# Patient Record
Sex: FEMALE | Race: BLACK OR AFRICAN AMERICAN | Employment: UNEMPLOYED | URBAN - METROPOLITAN AREA
[De-identification: names, ages, dates, MRNs, and addresses within clinical notes are randomized per-mention and may not be internally consistent; named-entity substitution may affect disease eponyms.]

---

## 2018-07-02 ENCOUNTER — APPOINTMENT (OUTPATIENT)
Dept: GENERAL RADIOLOGY | Age: 68
End: 2018-07-02
Attending: EMERGENCY MEDICINE
Payer: MEDICARE

## 2018-07-02 ENCOUNTER — HOSPITAL ENCOUNTER (EMERGENCY)
Age: 68
Discharge: HOME | End: 2018-07-02
Attending: EMERGENCY MEDICINE
Payer: MEDICARE

## 2018-07-02 VITALS
RESPIRATION RATE: 18 BRPM | WEIGHT: 135 LBS | SYSTOLIC BLOOD PRESSURE: 161 MMHG | HEART RATE: 63 BPM | BODY MASS INDEX: 26.5 KG/M2 | HEIGHT: 60 IN | DIASTOLIC BLOOD PRESSURE: 87 MMHG | OXYGEN SATURATION: 99 % | TEMPERATURE: 96.9 F

## 2018-07-02 DIAGNOSIS — T14.8XXA MUSCLE STRAIN: ICD-10-CM

## 2018-07-02 DIAGNOSIS — M25.541 ARTHRALGIA OF RIGHT HAND: Primary | ICD-10-CM

## 2018-07-02 LAB
ALBUMIN SERPL-MCNC: 3.7 G/DL (ref 3.4–5)
ALBUMIN/GLOB SERPL: 0.8 {RATIO} (ref 0.8–1.7)
ALP SERPL-CCNC: 81 U/L (ref 45–117)
ALT SERPL-CCNC: 11 U/L (ref 13–56)
ANION GAP SERPL CALC-SCNC: 3 MMOL/L (ref 3–18)
AST SERPL-CCNC: 17 U/L (ref 15–37)
ATRIAL RATE: 64 BPM
BASOPHILS # BLD: 0 K/UL (ref 0–0.06)
BASOPHILS NFR BLD: 1 % (ref 0–2)
BILIRUB SERPL-MCNC: 0.4 MG/DL (ref 0.2–1)
BUN SERPL-MCNC: 12 MG/DL (ref 7–18)
BUN/CREAT SERPL: 12 (ref 12–20)
CALCIUM SERPL-MCNC: 9.3 MG/DL (ref 8.5–10.1)
CALCULATED P AXIS, ECG09: 20 DEGREES
CALCULATED R AXIS, ECG10: -8 DEGREES
CALCULATED T AXIS, ECG11: 32 DEGREES
CHLORIDE SERPL-SCNC: 109 MMOL/L (ref 100–108)
CO2 SERPL-SCNC: 30 MMOL/L (ref 21–32)
CREAT SERPL-MCNC: 0.98 MG/DL (ref 0.6–1.3)
DIAGNOSIS, 93000: NORMAL
DIFFERENTIAL METHOD BLD: ABNORMAL
EOSINOPHIL # BLD: 0.1 K/UL (ref 0–0.4)
EOSINOPHIL NFR BLD: 1 % (ref 0–5)
ERYTHROCYTE [DISTWIDTH] IN BLOOD BY AUTOMATED COUNT: 12.6 % (ref 11.6–14.5)
GLOBULIN SER CALC-MCNC: 4.6 G/DL (ref 2–4)
GLUCOSE SERPL-MCNC: 90 MG/DL (ref 74–99)
HCT VFR BLD AUTO: 38.9 % (ref 35–45)
HGB BLD-MCNC: 13.4 G/DL (ref 12–16)
LYMPHOCYTES # BLD: 3 K/UL (ref 0.9–3.6)
LYMPHOCYTES NFR BLD: 47 % (ref 21–52)
MCH RBC QN AUTO: 32.4 PG (ref 24–34)
MCHC RBC AUTO-ENTMCNC: 34.4 G/DL (ref 31–37)
MCV RBC AUTO: 94.2 FL (ref 74–97)
MONOCYTES # BLD: 0.5 K/UL (ref 0.05–1.2)
MONOCYTES NFR BLD: 8 % (ref 3–10)
NEUTS SEG # BLD: 2.8 K/UL (ref 1.8–8)
NEUTS SEG NFR BLD: 43 % (ref 40–73)
P-R INTERVAL, ECG05: 148 MS
PLATELET # BLD AUTO: 216 K/UL (ref 135–420)
PMV BLD AUTO: 9.8 FL (ref 9.2–11.8)
POTASSIUM SERPL-SCNC: 3.8 MMOL/L (ref 3.5–5.5)
PROT SERPL-MCNC: 8.3 G/DL (ref 6.4–8.2)
Q-T INTERVAL, ECG07: 384 MS
QRS DURATION, ECG06: 72 MS
QTC CALCULATION (BEZET), ECG08: 396 MS
RBC # BLD AUTO: 4.13 M/UL (ref 4.2–5.3)
SODIUM SERPL-SCNC: 142 MMOL/L (ref 136–145)
VENTRICULAR RATE, ECG03: 64 BPM
WBC # BLD AUTO: 6.4 K/UL (ref 4.6–13.2)

## 2018-07-02 PROCEDURE — 85025 COMPLETE CBC W/AUTO DIFF WBC: CPT | Performed by: EMERGENCY MEDICINE

## 2018-07-02 PROCEDURE — 74011250637 HC RX REV CODE- 250/637: Performed by: EMERGENCY MEDICINE

## 2018-07-02 PROCEDURE — 80053 COMPREHEN METABOLIC PANEL: CPT | Performed by: EMERGENCY MEDICINE

## 2018-07-02 PROCEDURE — 99284 EMERGENCY DEPT VISIT MOD MDM: CPT

## 2018-07-02 PROCEDURE — 93005 ELECTROCARDIOGRAM TRACING: CPT

## 2018-07-02 PROCEDURE — 71045 X-RAY EXAM CHEST 1 VIEW: CPT

## 2018-07-02 RX ORDER — NAPROXEN 500 MG/1
500 TABLET ORAL 2 TIMES DAILY WITH MEALS
Qty: 20 TAB | Refills: 0 | Status: SHIPPED | OUTPATIENT
Start: 2018-07-02 | End: 2018-07-12

## 2018-07-02 RX ORDER — PREDNISONE 50 MG/1
50 TABLET ORAL DAILY
Qty: 3 TAB | Refills: 0 | Status: SHIPPED | OUTPATIENT
Start: 2018-07-02 | End: 2018-07-02

## 2018-07-02 RX ORDER — ALBUTEROL SULFATE 90 UG/1
1 AEROSOL, METERED RESPIRATORY (INHALATION)
Qty: 1 INHALER | Refills: 0 | Status: SHIPPED | OUTPATIENT
Start: 2018-07-02 | End: 2018-07-02

## 2018-07-02 RX ORDER — NAPROXEN 250 MG/1
500 TABLET ORAL 2 TIMES DAILY WITH MEALS
Status: DISCONTINUED | OUTPATIENT
Start: 2018-07-02 | End: 2018-07-02 | Stop reason: HOSPADM

## 2018-07-02 RX ADMIN — Medication 500 MG: at 10:15

## 2018-07-02 NOTE — ED NOTES
Discharge Instructions reviewed with patient/family. Patient/family  states understanding. . Condition stable/imroved. Opportunity for questions provided. E-sign not available  Hard copy instructions signed. Pt states verbal understanding of instructions  Arm band removed and shredded.

## 2018-07-02 NOTE — ED PROVIDER NOTES
EMERGENCY DEPARTMENT HISTORY AND PHYSICAL EXAM    9:55 AM      Date: 7/2/2018  Patient Name: Christiano Anguiano    History of Presenting Illness     Chief Complaint   Patient presents with    Shortness of Breath    Hand Pain     History Provided By: Patient    Additional History (Context): Christiano Anguiano is a 75 y.o. female with No significant past medical history who presents with constant 8/10 back pain that started 3 days ago. Pt reports her right thoracic area back pain is exacerbated by raising her right arm above her shoulder. The pain was described as a sharp sensation. She denies traumatic injury. Her pain is causing her to intermittently become SOB. No other concerns, modifying factors, or symptoms were expressed by the pt at this time. PCP: Prudence Kay MD    Chief Complaint: Back pain  Duration:  3 days  Timing:  constant  Location: Right thoracic  Quality: Sharp  Severity: 8/10  Modifying Factors: Raising right arm exacerbates the pain  Associated Symptoms: SOB      Current Facility-Administered Medications   Medication Dose Route Frequency Provider Last Rate Last Dose    naproxen (NAPROSYN) tablet 500 mg  500 mg Oral BID WITH MEALS Madaline Burkitt, MD         Current Outpatient Prescriptions   Medication Sig Dispense Refill    meloxicam (MOBIC) 15 mg tablet Take 1 Tab by mouth daily. 30 Tab 3    cyclobenzaprine (FLEXERIL) 10 mg tablet Take 1 Tab by mouth three (3) times daily (with meals). 90 Tab 3    oxyCODONE-acetaminophen (PERCOCET) 5-325 mg per tablet Take 1 Tab by mouth every eight (8) hours as needed for Pain. Max Daily Amount: 3 Tabs. 61 Tab 0       Past History     Past Medical History:  No past medical history on file. Past Surgical History:  No past surgical history on file.     Family History:  Family History   Problem Relation Age of Onset    Cancer Mother        Social History:  Social History   Substance Use Topics    Smoking status: Never Smoker    Smokeless tobacco: Not on file    Alcohol use No       Allergies:  No Known Allergies      Review of Systems     Review of Systems   Constitutional: Negative for chills and fatigue. Respiratory: Positive for shortness of breath. Cardiovascular: Negative for chest pain. Gastrointestinal: Negative for abdominal pain, nausea and vomiting. Genitourinary: Negative for hematuria. Musculoskeletal: Positive for back pain (right thoracic). Negative for neck pain. Neurological: Negative for weakness and headaches. All other systems reviewed and are negative. Physical Exam     Visit Vitals    BP (!) 189/129 (BP 1 Location: Left arm, BP Patient Position: At rest;Sitting)    Pulse 70    Temp 96.9 °F (36.1 °C)    Resp 16    Ht 5' (1.524 m)    Wt 61.2 kg (135 lb)    SpO2 99%    BMI 26.37 kg/m2       Physical Exam   Constitutional: She is oriented to person, place, and time. She appears well-developed. HENT:   Head: Normocephalic and atraumatic. Eyes: EOM are normal. Pupils are equal, round, and reactive to light. Neck: Normal range of motion. Neck supple. Cardiovascular: Normal rate, regular rhythm and normal heart sounds. Exam reveals no friction rub. No murmur heard. Pulmonary/Chest: Effort normal and breath sounds normal. No respiratory distress. She has no wheezes. Abdominal: Soft. She exhibits no distension. There is no tenderness. There is no rebound and no guarding. Musculoskeletal: Normal range of motion. Pain to rhomboid, lower, on right; worse with arm movement   Neurological: She is alert and oriented to person, place, and time. Skin: Skin is warm and dry. Psychiatric: She has a normal mood and affect. Her behavior is normal. Thought content normal.         Diagnostic Study Results   EKG: nsr at 64; nl axis. Nl in. No mary/d. No hypertrophy.    cxr FINDINGS:  A portable frontal chest radiograph shows clear lungs.  No pleural  effusion is noted.  Cardiac silhouette, mediastinum and hilar regions are  unremarkable.  Moderate tortuosity is noted in the descending thoracic aorta. Medical Decision Making   1. Sob;  treated here, feeling better, due ot pain with deep breathing; worse with movement and palpation; considered acs/pe but not c/w   2. Hand pain - chronic; has seen ortho  3. Muscles strain- worse with movement better, with rest.     Diagnosis     No diagnosis found. _______________________________    Attestations:  Scribe Attestation     Randal Morales acting as a scribe for and in the presence of Enrico Griffith MD      July 02, 2018 at 9:55 AM       Provider Attestation:      I personally performed the services described in the documentation, reviewed the documentation, as recorded by the scribe in my presence, and it accurately and completely records my words and actions.  July 02, 2018 at 9:55 AM - Enrico Griffith MD    _______________________________

## 2018-07-02 NOTE — DISCHARGE INSTRUCTIONS
COPD Exacerbation Plan: Care Instructions  Your Care Instructions    If you have chronic obstructive pulmonary disease (COPD), your usual shortness of breath could suddenly get worse. You may start coughing more and have more mucus. This flare-up is called a COPD exacerbation (say \"de-SGC-ht-BAY-tommy\"). A lung infection or air pollution could set off an exacerbation. Sometimes it can happen after a quick change in temperature or being around chemicals. Work with your doctor to make a plan for dealing with an exacerbation. You can better manage it if you plan ahead. Follow-up care is a key part of your treatment and safety. Be sure to make and go to all appointments, and call your doctor if you are having problems. It's also a good idea to know your test results and keep a list of the medicines you take. How can you care for yourself at home? During an exacerbation  · Do not panic if you start to have one. Quick treatment at home may help you prevent serious breathing problems. If you have a COPD exacerbation plan that you developed with your doctor, follow it. · Take your medicines exactly as your doctor tells you. ¨ Use your inhaler as directed by your doctor. If your symptoms do not get better after you use your medicine, have someone take you to the emergency room. Call an ambulance if necessary. ¨ With inhaled medicines, a spacer or a nebulizer may help you get more medicine to your lungs. Ask your doctor or pharmacist how to use them properly. Practice using the spacer in front of a mirror before you have an exacerbation. This may help you get the medicine into your lungs quickly. ¨ If your doctor has given you steroid pills, take them as directed. ¨ Your doctor may have given you a prescription for antibiotics, which you can fill if you need it. ¨ Talk to your doctor if you have any problems with your medicine.  And call your doctor if you have to use your antibiotic or steroid pills.  Preventing an exacerbation  · Do not smoke. This is the most important step you can take to prevent more damage to your lungs and prevent problems. If you already smoke, it is never too late to stop. If you need help quitting, talk to your doctor about stop-smoking programs and medicines. These can increase your chances of quitting for good. · Take your daily medicines as prescribed. · Avoid colds and flu. ¨ Get a pneumococcal vaccine. ¨ Get a flu vaccine each year, as soon as it is available. Ask those you live or work with to do the same, so they will not get the flu and infect you. ¨ Try to stay away from people with colds or the flu. ¨ Wash your hands often. · Avoid secondhand smoke; air pollution; cold, dry air; hot, humid air; and high altitudes. Stay at home with your windows closed when air pollution is bad. · Learn breathing techniques for COPD, such as breathing through pursed lips. These techniques can help you breathe easier during an exacerbation. When should you call for help? Call 911 anytime you think you may need emergency care. For example, call if:  ? · You have severe trouble breathing. ? · You have severe chest pain. ?Call your doctor now or seek immediate medical care if:  ? · You have new or worse shortness of breath. ? · You develop new chest pain. ? · You are coughing more deeply or more often, especially if you notice more mucus or a change in the color of your mucus. ? · You cough up blood. ? · You have new or increased swelling in your legs or belly. ? · You have a fever. ? Watch closely for changes in your health, and be sure to contact your doctor if:  ? · You need to use your antibiotic or steroid pills. ? · Your symptoms are getting worse. Where can you learn more? Go to http://myranda-cornelia.info/. Enter C386 in the search box to learn more about \"COPD Exacerbation Plan: Care Instructions. \"  Current as of:  May 12, 2017  Content Version: 11.4  © 8617-2225 [employer]. Care instructions adapted under license by [employer] (which disclaims liability or warranty for this information). If you have questions about a medical condition or this instruction, always ask your healthcare professional. [address] any warranty or liability for your use of this information. Musculoskeletal Pain: Care Instructions  Your Care Instructions    Different problems with the bones, muscles, nerves, ligaments, and tendons in the body can cause pain. One or more areas of your body may ache or burn. Or they may feel tired, stiff, or sore. The medical term for this type of pain is musculoskeletal pain. It can have many different causes. Sometimes the pain is caused by an injury such as a strain or sprain. Or you might have pain from using one part of your body in the same way over and over again. This is called overuse. In some cases, the cause of the pain is another health problem such as arthritis or fibromyalgia. The doctor will examine you and ask you questions about your health to help find the cause of your pain. Blood tests or imaging tests like an X-ray may also be helpful. But sometimes doctors can't find a cause of the pain. Treatment depends on your symptoms and the cause of the pain, if known. The doctor has checked you carefully, but problems can develop later. If you notice any problems or new symptoms, get medical treatment right away. Follow-up care is a key part of your treatment and safety. Be sure to make and go to all appointments, and call your doctor if you are having problems. It's also a good idea to know your test results and keep a list of the medicines you take. How can you care for yourself at home? · Rest until you feel better. · Do not do anything that makes the pain worse. Return to exercise gradually if you feel better and your doctor says it's okay.   · Be safe with medicines. Read and follow all instructions on the label. ¨ If the doctor gave you a prescription medicine for pain, take it as prescribed. ¨ If you are not taking a prescription pain medicine, ask your doctor if you can take an over-the-counter medicine. · Put ice or a cold pack on the area for 10 to 20 minutes at a time to ease pain. Put a thin cloth between the ice and your skin. When should you call for help? Call your doctor now or seek immediate medical care if:  ? · You have new pain, or your pain gets worse. ? · You have new symptoms such as a fever, a rash, or chills. ? Watch closely for changes in your health, and be sure to contact your doctor if:  ? · You do not get better as expected. Where can you learn more? Go to http://myranda-cornelia.info/. Enter Y010 in the search box to learn more about \"Musculoskeletal Pain: Care Instructions. \"  Current as of: October 14, 2016  Content Version: 11.4  © 3951-8997 [employer]. Care instructions adapted under license by [employer] (which disclaims liability or warranty for this information). If you have questions about a medical condition or this instruction, always ask your healthcare professional. [address] any warranty or liability for your use of this information.

## 2018-07-05 ENCOUNTER — PATIENT OUTREACH (OUTPATIENT)
Dept: FAMILY MEDICINE CLINIC | Age: 68
End: 2018-07-05

## 2018-07-05 NOTE — PROGRESS NOTES
Nurse Navigator spoke with patient via telephone call. Patient stated that someone called her yesterday and spoke with her. Nurse Navigator offered to assist patient with estalishing care at another practice in the local area. Justin is interested in establishing care at [facility]. Nurse Navigator called patient back with a follow up appointment with KODY Benavides @ [facility]. Patient stated that [address] is too far away and she would like something closer to her on [address]. Nurse navigator followed up with a new patient appointment /ED Follow up with Claudell Catching @ [facility] for 18 @   12:30. Nurse navigator called patient and provided appointment date, time, location, and phone number. Patient was agreeable with this appointment. ED  Discharge Follow-Up    Patient was admitted to [facility] ED on 18 and discharged on 18 for (Per ED progress note 18): - Arthralgia of right hand  - Muscle Strain      The physician progress note  was available at the time of outreach. Patient was contacted within 3 business days of discharge. Top Challenges reviewed with the provider   Patient to establish as a new patient @ [facility]. Method of communication with provider   Staff message     Inpatient RRAT score: n/a   Was this a readmission? no   Patient stated reason for the readmission: n/a     Nurse Navigator (NN) contacted the patient by telephone to perform post hospital discharge assessment. Verified name and  with patient as identifiers. Provided introduction to self, and explanation of the Nurse Navigator role. Reviewed discharge instructions and red flags with patient who verbalized understanding. Patient given an opportunity to ask questions and does not have any further questions or concerns at this time.  The patient agrees to contact the PCP office for questions related to their healthcare. NN provided contact information for future reference. Disease Specific:   N/A    Summary of patient's top problems:  1. Patient to establish care as a new patient with [facility]. Home Health orders at discharge: No   [address]: n/a   Date of initial visit: n/a     Durable Medical Equipment ordered/company: n/a   Durable Medical Equipment received: n/a     Barriers to care?     - Transportation     Advance Care Planning:   Does patient have an Advance Directive:  not on file     Medication(s):     New Medications at Discharge:   - Naprosyn   Changed Medications at Discharge: n/a  Discontinued Medications at Discharge: n/a       Referral to Pharm D needed: no     Current Outpatient Prescriptions   Medication Sig    naproxen (NAPROSYN) 500 mg tablet Take 1 Tab by mouth two (2) times daily (with meals) for 10 days.  meloxicam (MOBIC) 15 mg tablet Take 1 Tab by mouth daily.  cyclobenzaprine (FLEXERIL) 10 mg tablet Take 1 Tab by mouth three (3) times daily (with meals).  oxyCODONE-acetaminophen (PERCOCET) 5-325 mg per tablet Take 1 Tab by mouth every eight (8) hours as needed for Pain. Max Daily Amount: 3 Tabs. No current facility-administered medications for this visit. There are no discontinued medications.     PCP/Specialist follow up:   Future Appointments  Date Time Provider Chaka Alvarez   7/9/2018 12:30 PM Herlinda Vizcarra NP [address] Provider Appointment: n/a      Goals        Patient Stated     Establish PCP relationships and regularly scheduled appointments. (pt-stated)            Target Date:  7-5-18  Plan:  Estabish care with [facility]

## 2018-07-06 ENCOUNTER — PATIENT OUTREACH (OUTPATIENT)
Dept: FAMILY MEDICINE CLINIC | Age: 68
End: 2018-07-06

## 2018-07-06 NOTE — PROGRESS NOTES
Nurse Navigator (NN) attempted to contact patient via telephone call. There was no response. A voicemail message was left requesting a non-emergency return telephone call. Nurse Navigator contact information provided. Nurse navigator attempted to contact patient re:   Reminder for PCP appointment scheduled for 7-9-18 @   12:30 @ [facility] and medication reconciliation and to please bring medications to PCP appointment. Nurse Navigator will continue with efforts to reach patient for follow up.

## 2018-07-09 ENCOUNTER — OFFICE VISIT (OUTPATIENT)
Dept: INTERNAL MEDICINE CLINIC | Age: 68
End: 2018-07-09

## 2018-07-09 ENCOUNTER — HOSPITAL ENCOUNTER (OUTPATIENT)
Dept: LAB | Age: 68
Discharge: HOME | End: 2018-07-09
Payer: MEDICARE

## 2018-07-09 VITALS
HEIGHT: 60 IN | BODY MASS INDEX: 27.09 KG/M2 | RESPIRATION RATE: 16 BRPM | WEIGHT: 138 LBS | TEMPERATURE: 97.8 F | HEART RATE: 90 BPM | DIASTOLIC BLOOD PRESSURE: 100 MMHG | OXYGEN SATURATION: 98 % | SYSTOLIC BLOOD PRESSURE: 162 MMHG

## 2018-07-09 DIAGNOSIS — Z78.0 MENOPAUSE: ICD-10-CM

## 2018-07-09 DIAGNOSIS — Z23 ENCOUNTER FOR IMMUNIZATION: ICD-10-CM

## 2018-07-09 DIAGNOSIS — M54.6 ACUTE THORACIC BACK PAIN, UNSPECIFIED BACK PAIN LATERALITY: ICD-10-CM

## 2018-07-09 DIAGNOSIS — Z12.11 COLON CANCER SCREENING: ICD-10-CM

## 2018-07-09 DIAGNOSIS — Z11.0 CHOLERA SCREENING: ICD-10-CM

## 2018-07-09 DIAGNOSIS — Z11.59 ENCOUNTER FOR HEPATITIS C SCREENING TEST FOR LOW RISK PATIENT: ICD-10-CM

## 2018-07-09 DIAGNOSIS — Z76.89 ENCOUNTER TO ESTABLISH CARE: Primary | ICD-10-CM

## 2018-07-09 DIAGNOSIS — M79.2 RADICULAR PAIN IN RIGHT ARM: ICD-10-CM

## 2018-07-09 DIAGNOSIS — Z12.39 BREAST CANCER SCREENING: ICD-10-CM

## 2018-07-09 DIAGNOSIS — Z13.5 GLAUCOMA SCREENING: ICD-10-CM

## 2018-07-09 PROBLEM — M19.90 ARTHRITIS: Status: ACTIVE | Noted: 2018-07-09

## 2018-07-09 LAB
CHOLEST SERPL-MCNC: 239 MG/DL
HDLC SERPL-MCNC: 64 MG/DL (ref 40–60)
HDLC SERPL: 3.7 {RATIO} (ref 0–5)
LDLC SERPL CALC-MCNC: 158.4 MG/DL (ref 0–100)
LIPID PROFILE,FLP: ABNORMAL
TRIGL SERPL-MCNC: 83 MG/DL (ref ?–150)
VLDLC SERPL CALC-MCNC: 16.6 MG/DL

## 2018-07-09 PROCEDURE — 86803 HEPATITIS C AB TEST: CPT | Performed by: NURSE PRACTITIONER

## 2018-07-09 PROCEDURE — 36415 COLL VENOUS BLD VENIPUNCTURE: CPT | Performed by: NURSE PRACTITIONER

## 2018-07-09 PROCEDURE — 80061 LIPID PANEL: CPT | Performed by: NURSE PRACTITIONER

## 2018-07-09 NOTE — PATIENT INSTRUCTIONS
Vaccine Information Statement     Pneumococcal Conjugate Vaccine (PCV13): What You Need to Know    Many Vaccine Information Statements are available in Amharic and other languages. See www.immunize.org/vis. Hojas de información Sobre Vacunas están disponibles en español y en muchos otros idiomas. Visite www.immunize.org/vis. 1. Why get vaccinated? Vaccination can protect both children and adults from pneumococcal disease. Pneumococcal disease is caused by bacteria that can spread from person to person through close contact. It can cause ear infections, and it can also lead to more serious infections of the:   Lungs (pneumonia),   Blood (bacteremia), and   Covering of the brain and spinal cord (meningitis). Pneumococcal pneumonia is most common among adults. Pneumococcal meningitis can cause deafness and brain damage, and it kills about 1 child in 10 who get it. Anyone can get pneumococcal disease, but children under 3years of age and adults 72 years and older, people with certain medical conditions, and cigarette smokers are at the highest risk. Before there was a vaccine, the [facility] saw:   more than 700 cases of meningitis,   about 13,000 blood infections,   about 5 million ear infections, and   about 200 deaths  in children under 5 each year from pneumococcal disease. Since vaccine became available, severe pneumococcal disease in these children has fallen by 88%. About 18,000 older adults die of pneumococcal disease each year in the [place]. Treatment of pneumococcal infections with penicillin and other drugs is not as effective as it used to be, because some strains of the disease have become resistant to these drugs. This makes prevention of the disease, through vaccination, even more important. 2. PCV13 vaccine    Pneumococcal conjugate vaccine (called PCV13) protects against 13 types of pneumococcal bacteria.       PCV13 is routinely given to children at 2, 4, 6, and 1515 months of age. It is also recommended for children and adults 3to 59years of age with certain health conditions, and for all adults 72years of age and older. Your doctor can give you details. 3. Some people should not get this vaccine    Anyone who has ever had a life-threatening allergic reaction to a dose of this vaccine, to an earlier pneumococcal vaccine called PCV7, or to any vaccine containing diphtheria toxoid (for example, DTaP), should not get PCV13. Anyone with a severe allergy to any component of PCV13 should not get the vaccine. Tell your doctor if the person being vaccinated has any severe allergies. If the person scheduled for vaccination is not feeling well, your healthcare provider might decide to reschedule the shot on another day. 4. Risks of a vaccine reaction    With any medicine, including vaccines, there is a chance of reactions. These are usually mild and go away on their own, but serious reactions are also possible. Problems reported following PCV13 varied by age and dose in the series. The most common problems reported among children were:    About half became drowsy after the shot, had a temporary loss of appetite, or had redness or tenderness where the shot was given.  About 1 out of 3 had swelling where the shot was given.  About 1 out of 3 had a mild fever, and about 1 in 20 had a fever over 102.2°F.   Up to about 8 out of 10 became fussy or irritable. Adults have reported pain, redness, and swelling where the shot was given; also mild fever, fatigue, headache, chills, or muscle pain. [facility] children who get PCV13 along with inactivated flu vaccine at the same time may be at increased risk for seizures caused by fever. Ask your doctor for more information. Problems that could happen after any vaccine:     People sometimes faint after a medical procedure, including vaccination.  Sitting or lying down for about 15 minutes can help prevent fainting, and injuries caused by a fall. Tell your doctor if you feel dizzy, or have vision changes or ringing in the ears.  Some older children and adults get severe pain in the shoulder and have difficulty moving the arm where a shot was given. This happens very rarely.  Any medication can cause a severe allergic reaction. Such reactions from a vaccine are very rare, estimated at about 1 in a million doses, and would happen within a few minutes to a few hours after the vaccination. As with any medicine, there is a very small chance of a vaccine causing a serious injury or death. The safety of vaccines is always being monitored. For more information, visit: www.cdc.gov/vaccinesafety/     5. What if there is a serious reaction? What should I look for?  Look for anything that concerns you, such as signs of a severe allergic reaction, very high fever, or unusual behavior. Signs of a severe allergic reaction can include hives, swelling of the face and throat, difficulty breathing, a fast heartbeat, dizziness, and weakness - usually within a few minutes to a few hours after the vaccination. What should I do?  If you think it is a severe allergic reaction or other emergency that cant wait, call 9-1-1 or get the person to the nearest hospital. Otherwise, call your doctor. Reactions should be reported to the Vaccine Adverse Event Reporting System (VAERS). Your doctor should file this report, or you can do it yourself through the VAERS web site at www.vaers. hhs.gov, or by calling 7-508.219.5368. VAERS does not give medical advice. 6. The National Vaccine Injury Compensation Program    The [facility] Vaccine Injury Compensation Program (VICP) is a federal program that was created to compensate people who may have been injured by certain vaccines.     Persons who believe they may have been injured by a vaccine can learn about the program and about filing a claim by calling 1-464.219.7604 or visiting the [facility] website at www.[facility].gov/vaccinecompensation. There is a time limit to file a claim for compensation. 7. How can I learn more?  Ask your healthcare provider. He or she can give you the vaccine package insert or suggest other sources of information.  Call your local or state health department.  Contact the Centers for Disease Control and Prevention (CDC):  - Call 2-591.981.8532 (3-129-UAC-INFO) or  - Visit CDCs website at www.cdc.gov/vaccines    Vaccine Information Statement   PCV13 Vaccine   11/5/2015   [address] [id number]    Department of Health and Human Services  Centers for Disease Control and Prevention    Office Use Only

## 2018-07-09 NOTE — PROGRESS NOTES
Christiano Anguiano is a 75 y.o. female presenting today for New Patient; Back Pain; and Arm Pain (right)  . HPI:  Christiano Anguiano presents to the office today to establish care with the practice. Patient has no significant Pmhx. She was recently seen in the ED for right wrist pain and constant 8/10 back pain that started 3 days ago. Pt reports her right thoracic area back pain is exacerbated by raising her right arm above her shoulder    Review of Systems   Respiratory: Negative for cough, sputum production and shortness of breath. Cardiovascular: Negative for chest pain and palpitations. Gastrointestinal: Negative for abdominal pain, nausea and vomiting. Genitourinary: Negative for dysuria. Musculoskeletal: Positive for back pain and joint pain (right wrist). Neck pain: thoracic back pain radiating down RUE. Neurological: Negative for headaches. No Known Allergies    Current Outpatient Prescriptions   Medication Sig Dispense Refill    naproxen (NAPROSYN) 500 mg tablet Take 1 Tab by mouth two (2) times daily (with meals) for 10 days. 20 Tab 0    meloxicam (MOBIC) 15 mg tablet Take 1 Tab by mouth daily. 30 Tab 3    cyclobenzaprine (FLEXERIL) 10 mg tablet Take 1 Tab by mouth three (3) times daily (with meals). 90 Tab 3    oxyCODONE-acetaminophen (PERCOCET) 5-325 mg per tablet Take 1 Tab by mouth every eight (8) hours as needed for Pain. Max Daily Amount: 3 Tabs. 61 Tab 0       Past Medical History:   Diagnosis Date    Arthritis        History reviewed. No pertinent surgical history. Social History     Social History    Marital status: UNKNOWN     Spouse name: N/A    Number of children: N/A    Years of education: N/A     Occupational History    Not on file.      Social History Main Topics    Smoking status: Never Smoker    Smokeless tobacco: Never Used    Alcohol use No    Drug use: Yes     Special: Marijuana    Sexual activity: Yes     Partners: Male     Other Topics Concern    Not on file     Social History Narrative    ** Merged History Encounter **            Patient does not have an advanced directive on file    Vitals:    07/09/18 1228   BP: (!) 162/100   Pulse: 90   Resp: 16   Temp: 97.8 °F (36.6 °C)   TempSrc: Tympanic   SpO2: 98%   Weight: 138 lb (62.6 kg)   Height: 5' (1.524 m)   PainSc:  10 - Worst pain ever   PainLoc: Arm       Physical Exam   Constitutional: No distress. HENT:   Head: Normocephalic. Cardiovascular: Normal rate, regular rhythm and normal heart sounds. Pulmonary/Chest: Effort normal and breath sounds normal.   Abdominal: Soft. Lymphadenopathy:     She has no cervical adenopathy. Neurological: She is alert. Skin: Skin is warm. Nursing note and vitals reviewed. Hospital Outpatient Visit on 07/09/2018   Component Date Value Ref Range Status    LIPID PROFILE 07/09/2018        Final    Cholesterol, total 07/09/2018 239* <200 MG/DL Final    Triglyceride 07/09/2018 83  <150 MG/DL Final    Comment: The drugs N-acetylcysteine (NAC) and  Metamiszole have been found to cause falsely  low results in this chemical assay. Please  be sure to submit blood samples obtained  BEFORE administration of either of these  drugs to assure correct results.       HDL Cholesterol 07/09/2018 64* 40 - 60 MG/DL Final    LDL, calculated 07/09/2018 158.4* 0 - 100 MG/DL Final    VLDL, calculated 07/09/2018 16.6  MG/DL Final    CHOL/HDL Ratio 07/09/2018 3.7  0 - 5.0   Final   Admission on 07/02/2018, Discharged on 07/02/2018   Component Date Value Ref Range Status    WBC 07/02/2018 6.4  4.6 - 13.2 K/uL Final    RBC 07/02/2018 4.13* 4.20 - 5.30 M/uL Final    HGB 07/02/2018 13.4  12.0 - 16.0 g/dL Final    HCT 07/02/2018 38.9  35.0 - 45.0 % Final    MCV 07/02/2018 94.2  74.0 - 97.0 FL Final    MCH 07/02/2018 32.4  24.0 - 34.0 PG Final    MCHC 07/02/2018 34.4  31.0 - 37.0 g/dL Final    RDW 07/02/2018 12.6  11.6 - 14.5 % Final    PLATELET 34/92/6186 385 135 - 420 K/uL Final    MPV 07/02/2018 9.8  9.2 - 11.8 FL Final    NEUTROPHILS 07/02/2018 43  40 - 73 % Final    LYMPHOCYTES 07/02/2018 47  21 - 52 % Final    MONOCYTES 07/02/2018 8  3 - 10 % Final    EOSINOPHILS 07/02/2018 1  0 - 5 % Final    BASOPHILS 07/02/2018 1  0 - 2 % Final    ABS. NEUTROPHILS 07/02/2018 2.8  1.8 - 8.0 K/UL Final    ABS. LYMPHOCYTES 07/02/2018 3.0  0.9 - 3.6 K/UL Final    ABS. MONOCYTES 07/02/2018 0.5  0.05 - 1.2 K/UL Final    ABS. EOSINOPHILS 07/02/2018 0.1  0.0 - 0.4 K/UL Final    ABS. BASOPHILS 07/02/2018 0.0  0.0 - 0.06 K/UL Final    DF 07/02/2018 AUTOMATED    Final    Sodium 07/02/2018 142  136 - 145 mmol/L Final    Potassium 07/02/2018 3.8  3.5 - 5.5 mmol/L Final    Chloride 07/02/2018 109* 100 - 108 mmol/L Final    CO2 07/02/2018 30  21 - 32 mmol/L Final    Anion gap 07/02/2018 3  3.0 - 18 mmol/L Final    Glucose 07/02/2018 90  74 - 99 mg/dL Final    BUN 07/02/2018 12  7.0 - 18 MG/DL Final    Creatinine 07/02/2018 0.98  0.6 - 1.3 MG/DL Final    BUN/Creatinine ratio 07/02/2018 12  12 - 20   Final    GFR est AA 07/02/2018 >60  >60 ml/min/1.73m2 Final    GFR est non-AA 07/02/2018 56* >60 ml/min/1.73m2 Final    Comment: (NOTE)  Estimated GFR is calculated using the Modification of Diet in Renal   Disease (MDRD) Study equation, reported for both  Americans   (GFRAA) and non- Americans (GFRNA), and normalized to 1.73m2   body surface area. The physician must decide which value applies to   the patient. The MDRD study equation should only be used in   individuals age 25 or older. It has not been validated for the   following: pregnant women, patients with serious comorbid conditions,   or on certain medications, or persons with extremes of body size,   muscle mass, or nutritional status.       Calcium 07/02/2018 9.3  8.5 - 10.1 MG/DL Final    Bilirubin, total 07/02/2018 0.4  0.2 - 1.0 MG/DL Final    ALT (SGPT) 07/02/2018 11* 13 - 56 U/L Final    AST (SGOT) 07/02/2018 17  15 - 37 U/L Final    Alk. phosphatase 07/02/2018 81  45 - 117 U/L Final    Protein, total 07/02/2018 8.3* 6.4 - 8.2 g/dL Final    Albumin 07/02/2018 3.7  3.4 - 5.0 g/dL Final    Globulin 07/02/2018 4.6* 2.0 - 4.0 g/dL Final    A-G Ratio 07/02/2018 0.8  0.8 - 1.7   Final    Ventricular Rate 07/02/2018 64  BPM Final    Atrial Rate 07/02/2018 64  BPM Final    P-R Interval 07/02/2018 148  ms Final    QRS Duration 07/02/2018 72  ms Final    Q-T Interval 07/02/2018 384  ms Final    QTC Calculation (Bezet) 07/02/2018 396  ms Final    Calculated P Axis 07/02/2018 20  degrees Final    Calculated R Axis 07/02/2018 -8  degrees Final    Calculated T Axis 07/02/2018 32  degrees Final    Diagnosis 07/02/2018    Final                    Value:Normal sinus rhythm with sinus arrhythmia  Moderate voltage criteria for LVH, may be normal variant  Borderline ECG  No previous ECGs available  Confirmed by Mannie Stanton MD, --- ([id number]) on 7/2/2018 3:51:13 PM         .  Results for orders placed or performed during the hospital encounter of 07/09/18   LIPID PANEL   Result Value Ref Range    LIPID PROFILE          Cholesterol, total 239 (H) <200 MG/DL    Triglyceride 83 <150 MG/DL    HDL Cholesterol 64 (H) 40 - 60 MG/DL    LDL, calculated 158.4 (H) 0 - 100 MG/DL    VLDL, calculated 16.6 MG/DL    CHOL/HDL Ratio 3.7 0 - 5.0         Assessment / Plan:      ICD-10-CM ICD-9-CM    1. Encounter to establish care Z76.89 V65.8    2. Acute thoracic back pain, unspecified back pain laterality M54.6 724.1 REFERRAL TO ORTHOPEDICS   3. Breast cancer screening Z12.31 V76.10 MARCOS MAMMO BI SCREENING INCL CAD   4. Colon cancer screening Z12.11 V76.51 REFERRAL TO GASTROENTEROLOGY   5. Encounter for immunization Z23 V03.89 ADMIN PNEUMOCOCCAL VACCINE      PNEUMOCOCCAL CONJ VACCINE 13 VALENT IM      DISCONTINUED: pneumococcal 13 brett conj dip (PREVNAR-13) 0.5 mL syrg injection   6. Cholera screening Z11.0 V74.0 LIPID PANEL   7. Glaucoma screening Z13.5 V80.1 REFERRAL TO OPHTHALMOLOGY   8. Encounter for hepatitis C screening test for low risk patient Z11.59 V73.89 HEPATITIS C AB   9. Menopause Z78.0 627.2 DEXA BONE DENSITY STUDY AXIAL   10. Radicular pain in right arm M79.2 729.2 REFERRAL TO ORTHOPEDICS     Referral to Ortho for thoracic back pain and right joint pain  Referral for Mammo  Referral for Colon  Prevnar vaccine given  Referral for Bone scan  Hep C screening  Referral for Glaucoma screening  Lipid paenl today  Blood pressure elevated but pain level 10. Will bring patient back in 2 weeks for f/u    Follow-up Disposition:  Return in about 2 weeks (around 7/23/2018), or if symptoms worsen or fail to improve. I asked the patient if she  had any questions and answered her  questions.   The patient stated that she understands the treatment plan and agrees with the treatment plan

## 2018-07-09 NOTE — MR AVS SNAPSHOT
[address] [facility] 
 
 
 [address] [facility], [address] [facility] [address] 
[phone] Patient: Krystle Esparza MRN: [id number] AIJ:[birth year] Visit Information Date & Time Provider Department Dept. Phone Encounter #  
 7/9/2018 12:30 PM Florina Palmer NP [facility] INTERNAL MEDICINE OF [address] [id number] Your Appointments 8/13/2018 11:00 AM  
New Patient with Hanna Garcia MD  
[address] [facility], [address] and [facility] ONE [address]) Appt Note: THORACIC BACK PAIN/REF & SCHD BY ARYA MAKI/RODOLFO 10:30AM, ID, INS CARD, MED LIST  
 [address] [facility] [address]  
[phone]  
  
   
 [address] [facility] [address] Upcoming Health Maintenance Date Due Hepatitis C Screening 1950 BREAST CANCER SCRN MAMMOGRAM 9/2/1999 FOBT Q 1 YEAR AGE 50-75 9/2/1999 ZOSTER VACCINE AGE 60> 7/2/2009 GLAUCOMA SCREENING Q2Y 9/2/2014 Bone Densitometry (Dexa) Screening 9/2/2014 MEDICARE YEARLY EXAM 3/14/2018 Pneumococcal 65+ Low/Medium Risk (1 of 2 - PCV13) 8/1/2018* DTaP/Tdap/Td series (1 - Tdap) 7/3/2023* Influenza Age 5 to Adult 8/1/2018 *Topic was postponed. The date shown is not the original due date. Allergies as of 7/9/2018  Review Complete On: 7/9/2018 By: Florina Palmer NP No Known Allergies Current Immunizations  Reviewed on 7/6/2018 No immunizations on file. Not reviewed this visit You Were Diagnosed With   
  
 Codes Comments Breast cancer screening    -  Primary ICD-10-CM: Z12.31 
ICD-9-CM: V76.10 Colon cancer screening     ICD-10-CM: Z12.11 ICD-9-CM: V76.51 Encounter for immunization     ICD-10-CM: Y14 ICD-9-CM: V03.89 Cholera screening     ICD-10-CM: Z11.0 ICD-9-CM: V74.0 Glaucoma screening     ICD-10-CM: Z13.5 ICD-9-CM: V80.1 Encounter for hepatitis C screening test for low risk patient     ICD-10-CM: Z11.59 
ICD-9-CM: V73.89 Menopause     ICD-10-CM: Z78.0 ICD-9-CM: 627.2 Acute thoracic back pain, unspecified back pain laterality     ICD-10-CM: M54.6 ICD-9-CM: 724.1 Radicular pain in right arm     ICD-10-CM: M79.2 ICD-9-CM: 729.2 Vitals BP Pulse Temp Resp Height(growth percentile) (!) 162/100 (BP 1 Location: Left arm, BP Patient Position: Sitting) 90 97.8 °F (36.6 °C) (Tympanic) 16 5' (1.524 m) Weight(growth percentile) SpO2 BMI OB Status Smoking Status 138 lb (62.6 kg) 98% 26.95 kg/m2 Menopause Never Smoker Vitals History BMI and BSA Data Body Mass Index Body Surface Area  
 26.95 kg/m 2 1.63 m 2 Preferred Pharmacy Pharmacy Name Phone [facility] [phone] Your Updated Medication List  
  
   
This list is accurate as of 18  1:45 PM.  Always use your most recent med list.  
  
  
  
  
 cyclobenzaprine 10 mg tablet Commonly known as:  FLEXERIL Take 1 Tab by mouth three (3) times daily (with meals). meloxicam 15 mg tablet Commonly known as:  MOBIC Take 1 Tab by mouth daily. naproxen 500 mg tablet Commonly known as:  NAPROSYN Take 1 Tab by mouth two (2) times daily (with meals) for 10 days. oxyCODONE-acetaminophen 5-325 mg per tablet Commonly known as:  PERCOCET Take 1 Tab by mouth every eight (8) hours as needed for Pain. Max Daily Amount: 3 Tabs. pneumococcal 13 brett conj dip 0.5 mL Syrg injection Commonly known as:  PREVNAR-13  
0.5 mL by IntraMUSCular route once for 1 dose. Prescriptions Sent to Pharmacy Refills  
 pneumococcal 13 brett conj dip (PREVNAR-13) 0.5 mL syrg injection 0 Si.5 mL by IntraMUSCular route once for 1 dose. Class: Normal  
 Pharmacy: [address] . Ph #: [phone] Route: IntraMUSCular We Performed the Following REFERRAL TO GASTROENTEROLOGY [MST00 Custom] REFERRAL TO OPHTHALMOLOGY [REF57 Custom] REFERRAL TO ORTHOPEDICS [MIX325 Custom] To-Do List   
 07/09/2018 Imaging:  DEXA BONE DENSITY STUDY AXIAL   
  
 07/09/2018 Imaging:  MARCOS MAMMO BI SCREENING INCL CAD Referral Information Referral ID Referred By Referred To  
  
 [id number] Aaliyah, [address] [facility], [place] [address] Phone: [phone] Fax: [phone] Visits Status Start Date End Date 1 New Request 7/9/18 7/9/19 If your referral has a status of pending review or denied, additional information will be sent to support the outcome of this decision. Referral ID Referred By Referred To  
 [id number] Mariana Wang MD  
   [facility], [address] Phone: [phone] Fax: [phone] Visits Status Start Date End Date 1 New Request 7/9/18 7/9/19 If your referral has a status of pending review or denied, additional information will be sent to support the outcome of this decision. Referral ID Referred By Referred To  
 [id number] CLEMENTSHASHI, [address] [facility], [address] Phone: [phone] Fax: [phone] Visits Status Start Date End Date 1 Authorized 7/9/18 7/9/19 If your referral has a status of pending review or denied, additional information will be sent to support the outcome of this decision. Introducing [facility] [place] [facility]! [employer] introduces [employer] patient portal. Now you can access parts of your medical record, email your doctor's office, and request medication refills online. 1. In your internet browser, go to [email or web address] 2. Click on the First Time User? Click Here link in the Sign In box. You will see the New Member Sign Up page. 3. Enter your [employer] Access Code exactly as it appears below. You will not need to use this code after youve completed the sign-up process. If you do not sign up before the expiration date, you must request a new code. · [employer] Access Code: [email or web address] Expires: 9/30/2018  9:45 AM 
 
4. Enter the last four digits of your Social Security Number (xxxx) and Date of Birth (mm/dd/yyyy) as indicated and click Submit. You will be taken to the next sign-up page. 5. Create a [employer] ID. This will be your [employer] login ID and cannot be changed, so think of one that is secure and easy to remember. 6. Create a [employer] password. You can change your password at any time. 7. Enter your Password Reset Question and Answer. This can be used at a later time if you forget your password. 8. Enter your e-mail address. You will receive e-mail notification when new information is available in [address] 9. Click Sign Up. You can now view and download portions of your medical record. 10. Click the Download Summary menu link to download a portable copy of your medical information. If you have questions, please visit the Frequently Asked Questions section of the [employer] website. Remember, [employer] is NOT to be used for urgent needs. For medical emergencies, dial 911. Now available from your iPhone and Android! Please provide this summary of care documentation to your next provider. Your primary care clinician is listed as JULIAN VÁZQUEZ. If you have any questions after today's visit, please call [phone].

## 2018-07-10 LAB
HCV AB SER IA-ACNC: 0.11 INDEX
HCV AB SERPL QL IA: NEGATIVE
HCV COMMENT,HCGAC: NORMAL

## 2018-07-23 ENCOUNTER — OFFICE VISIT (OUTPATIENT)
Dept: INTERNAL MEDICINE CLINIC | Age: 68
End: 2018-07-23

## 2018-07-23 VITALS
WEIGHT: 142 LBS | SYSTOLIC BLOOD PRESSURE: 172 MMHG | OXYGEN SATURATION: 99 % | BODY MASS INDEX: 27.88 KG/M2 | HEART RATE: 84 BPM | TEMPERATURE: 97.7 F | DIASTOLIC BLOOD PRESSURE: 100 MMHG | HEIGHT: 60 IN | RESPIRATION RATE: 16 BRPM

## 2018-07-23 DIAGNOSIS — M25.531 RIGHT WRIST PAIN: ICD-10-CM

## 2018-07-23 DIAGNOSIS — M54.6 CHRONIC THORACIC BACK PAIN, UNSPECIFIED BACK PAIN LATERALITY: ICD-10-CM

## 2018-07-23 DIAGNOSIS — I10 HYPERTENSION, UNSPECIFIED TYPE: Primary | ICD-10-CM

## 2018-07-23 DIAGNOSIS — G89.29 CHRONIC THORACIC BACK PAIN, UNSPECIFIED BACK PAIN LATERALITY: ICD-10-CM

## 2018-07-23 RX ORDER — ACETAMINOPHEN AND CODEINE PHOSPHATE 300; 30 MG/1; MG/1
1 TABLET ORAL
Qty: 28 TAB | Refills: 0 | Status: SHIPPED | OUTPATIENT
Start: 2018-07-23

## 2018-07-23 RX ORDER — AMLODIPINE BESYLATE 5 MG/1
5 TABLET ORAL DAILY
Qty: 30 TAB | Refills: 1 | Status: SHIPPED | OUTPATIENT
Start: 2018-07-23

## 2018-07-23 NOTE — PROGRESS NOTES
Chief Complaint   Patient presents with   Sheila Annual Wellness Visit         Hypertension         Is someone accompanying this pt? no    Is the patient using any DME equipment during OV? no    Depression Screening:  PHQ over the last two weeks 7/9/2018 7/20/2015   Little interest or pleasure in doing things Several days Several days   Feeling down, depressed, irritable, or hopeless Several days Several days   Total Score PHQ 2 2 2       Learning Assessment:  Learning Assessment 7/23/2018 7/20/2015   PRIMARY LEARNER Patient Patient   HIGHEST LEVEL OF EDUCATION - PRIMARY LEARNER  DID NOT GRADUATE HIGH SCHOOL DID NOT GRADUATE HIGH SCHOOL   BARRIERS PRIMARY LEARNER NONE NONE   CO-LEARNER CAREGIVER No No   PRIMARY LANGUAGE ENGLISH ENGLISH   LEARNER PREFERENCE PRIMARY DEMONSTRATION DEMONSTRATION   ANSWERED BY patient patient   RELATIONSHIP SELF SELF       Abuse Screening:  Abuse Screening Questionnaire 7/23/2018   Do you ever feel afraid of your partner? N   Are you in a relationship with someone who physically or mentally threatens you? N   Is it safe for you to go home? Y       Fall Risk  Fall Risk Assessment, last 12 mths 7/9/2018 7/20/2015   Able to walk? Yes Yes   Fall in past 12 months? No No         Health Maintenance reviewed and discussed per provider. Pt is due for   Health Maintenance Due   Topic    ZOSTER VACCINE AGE 60>     MEDICARE YEARLY EXAM     Please order/place referral if appropriate. Pt currently taking Antiplatelet therapy? no      Coordination of Care:  1. Have you been to the ER, urgent care clinic since your last visit? Hospitalized since your last visit? no    2. Have you seen or consulted any other health care providers outside of the [address] [place] [address] since your last visit? Include any pap smears or colon screening. no    Please see Red banners under Allergies, Med rec, Immunizations to remove outside inquires.  All correct information has been verified with patient and added to chart.

## 2018-07-23 NOTE — PROGRESS NOTES
Preeti Alvarez is a 75 y.o. female presenting today for Annual Wellness Visit () and Hypertension  . HPI:  Preeti Alvarez presents to the office today for elevated blood pressure follow-up care. Patient was in the office x 2 weeks ago with right wrist pain and elevated blood pressure. Thought the blood pressure was elevated secondary to pain so patient was given something for pain and asked to return in 2 weeks for follow-up. She presents for follow-up this morning and her blood pressure remains elevated at 172/100. Rupert Blum She continues to complain of right wrist pain 10/10     Review of Systems   Respiratory: Negative for cough. Cardiovascular: Negative for chest pain and palpitations. Musculoskeletal: Positive for back pain and joint pain (right wrist pain). No Known Allergies    Current Outpatient Prescriptions   Medication Sig Dispense Refill    acetaminophen-codeine (TYLENOL #3) 300-30 mg per tablet Take 1 Tab by mouth every four (4) hours as needed for Pain. Max Daily Amount: 6 Tabs. 28 Tab 0    amLODIPine (NORVASC) 5 mg tablet Take 1 Tab by mouth daily. 30 Tab 1       Past Medical History:   Diagnosis Date    Arthritis        History reviewed. No pertinent surgical history. Social History     Social History    Marital status: UNKNOWN     Spouse name: N/A    Number of children: N/A    Years of education: N/A     Occupational History    Not on file.      Social History Main Topics    Smoking status: Never Smoker    Smokeless tobacco: Never Used    Alcohol use No    Drug use: Yes     Special: Marijuana    Sexual activity: Yes     Partners: Male     Other Topics Concern    Not on file     Social History Narrative    ** Merged History Encounter **            Patient does not have an advanced directive on file    Vitals:    07/23/18 0927   BP: (!) 172/100   Pulse: 84   Resp: 16   Temp: 97.7 °F (36.5 °C)   TempSrc: Tympanic   SpO2: 99%   Weight: 142 lb (64.4 kg)   Height: 5' (1.524 m)   PainSc:  10 - Worst pain ever   PainLoc: Back       Physical Exam   Cardiovascular: Normal rate, regular rhythm and normal heart sounds. Pulmonary/Chest: Effort normal and breath sounds normal.   Musculoskeletal: She exhibits edema and tenderness. Right wrist: She exhibits tenderness and swelling. Thoracic back: She exhibits pain. She exhibits no tenderness and no swelling. Neurological: She is alert. Nursing note and vitals reviewed. Hospital Outpatient Visit on 07/09/2018   Component Date Value Ref Range Status    LIPID PROFILE 07/09/2018        Final    Cholesterol, total 07/09/2018 239* <200 MG/DL Final    Triglyceride 07/09/2018 83  <150 MG/DL Final    Comment: The drugs N-acetylcysteine (NAC) and  Metamiszole have been found to cause falsely  low results in this chemical assay. Please  be sure to submit blood samples obtained  BEFORE administration of either of these  drugs to assure correct results.  HDL Cholesterol 07/09/2018 64* 40 - 60 MG/DL Final    LDL, calculated 07/09/2018 158.4* 0 - 100 MG/DL Final    VLDL, calculated 07/09/2018 16.6  MG/DL Final    CHOL/HDL Ratio 07/09/2018 3.7  0 - 5.0   Final    Hepatitis C virus Ab 07/09/2018 0.11  <0.80 Index Final    Hep C  virus Ab Interp. 07/09/2018 NEGATIVE   NEG   Final    Hep C  virus Ab comment 07/09/2018        Final    Comment: Index <0.80. ......................... Enmanuel Plough Negative  Index > or = to 0.80 and <1.00. .... Enmanuel Plough Enmanuel Plough Equivocal  Index >1.00. ......................... Enmanuel Plough Positive          For Equivocal or Positive results, confirmation with Hepatitis C RNA by PCR or bDNA is suggested.      Admission on 07/02/2018, Discharged on 07/02/2018   Component Date Value Ref Range Status    WBC 07/02/2018 6.4  4.6 - 13.2 K/uL Final    RBC 07/02/2018 4.13* 4.20 - 5.30 M/uL Final    HGB 07/02/2018 13.4  12.0 - 16.0 g/dL Final    HCT 07/02/2018 38.9  35.0 - 45.0 % Final    MCV 07/02/2018 94.2  74.0 - 97.0 FL Final    MCH 07/02/2018 32.4  24.0 - 34.0 PG Final    MCHC 07/02/2018 34.4  31.0 - 37.0 g/dL Final    RDW 07/02/2018 12.6  11.6 - 14.5 % Final    PLATELET 83/49/6403 809  135 - 420 K/uL Final    MPV 07/02/2018 9.8  9.2 - 11.8 FL Final    NEUTROPHILS 07/02/2018 43  40 - 73 % Final    LYMPHOCYTES 07/02/2018 47  21 - 52 % Final    MONOCYTES 07/02/2018 8  3 - 10 % Final    EOSINOPHILS 07/02/2018 1  0 - 5 % Final    BASOPHILS 07/02/2018 1  0 - 2 % Final    ABS. NEUTROPHILS 07/02/2018 2.8  1.8 - 8.0 K/UL Final    ABS. LYMPHOCYTES 07/02/2018 3.0  0.9 - 3.6 K/UL Final    ABS. MONOCYTES 07/02/2018 0.5  0.05 - 1.2 K/UL Final    ABS. EOSINOPHILS 07/02/2018 0.1  0.0 - 0.4 K/UL Final    ABS. BASOPHILS 07/02/2018 0.0  0.0 - 0.06 K/UL Final    DF 07/02/2018 AUTOMATED    Final    Sodium 07/02/2018 142  136 - 145 mmol/L Final    Potassium 07/02/2018 3.8  3.5 - 5.5 mmol/L Final    Chloride 07/02/2018 109* 100 - 108 mmol/L Final    CO2 07/02/2018 30  21 - 32 mmol/L Final    Anion gap 07/02/2018 3  3.0 - 18 mmol/L Final    Glucose 07/02/2018 90  74 - 99 mg/dL Final    BUN 07/02/2018 12  7.0 - 18 MG/DL Final    Creatinine 07/02/2018 0.98  0.6 - 1.3 MG/DL Final    BUN/Creatinine ratio 07/02/2018 12  12 - 20   Final    GFR est AA 07/02/2018 >60  >60 ml/min/1.73m2 Final    GFR est non-AA 07/02/2018 56* >60 ml/min/1.73m2 Final    Comment: (NOTE)  Estimated GFR is calculated using the Modification of Diet in Renal   Disease (MDRD) Study equation, reported for both  Americans   (GFRAA) and non- Americans (GFRNA), and normalized to 1.73m2   body surface area. The physician must decide which value applies to   the patient. The MDRD study equation should only be used in   individuals age 25 or older. It has not been validated for the   following: pregnant women, patients with serious comorbid conditions,   or on certain medications, or persons with extremes of body size,   muscle mass, or nutritional status.       Calcium 07/02/2018 9.3  8.5 - 10.1 MG/DL Final    Bilirubin, total 07/02/2018 0.4  0.2 - 1.0 MG/DL Final    ALT (SGPT) 07/02/2018 11* 13 - 56 U/L Final    AST (SGOT) 07/02/2018 17  15 - 37 U/L Final    Alk. phosphatase 07/02/2018 81  45 - 117 U/L Final    Protein, total 07/02/2018 8.3* 6.4 - 8.2 g/dL Final    Albumin 07/02/2018 3.7  3.4 - 5.0 g/dL Final    Globulin 07/02/2018 4.6* 2.0 - 4.0 g/dL Final    A-G Ratio 07/02/2018 0.8  0.8 - 1.7   Final    Ventricular Rate 07/02/2018 64  BPM Final    Atrial Rate 07/02/2018 64  BPM Final    P-R Interval 07/02/2018 148  ms Final    QRS Duration 07/02/2018 72  ms Final    Q-T Interval 07/02/2018 384  ms Final    QTC Calculation (Bezet) 07/02/2018 396  ms Final    Calculated P Axis 07/02/2018 20  degrees Final    Calculated R Axis 07/02/2018 -8  degrees Final    Calculated T Axis 07/02/2018 32  degrees Final    Diagnosis 07/02/2018    Final                    Value:Normal sinus rhythm with sinus arrhythmia  Moderate voltage criteria for LVH, may be normal variant  Borderline ECG  No previous ECGs available  Confirmed by Humberto Harris MD, --- ([id number]) on 7/2/2018 3:51:13 PM         .No results found for any visits on 07/23/18. Assessment / Plan:      ICD-10-CM ICD-9-CM    1. Hypertension, unspecified type I10 401.9 amLODIPine (NORVASC) 5 mg tablet   2. Right wrist pain M25.531 719.43 acetaminophen-codeine (TYLENOL #3) 300-30 mg per tablet   3. Chronic thoracic back pain, unspecified back pain laterality M54.6 724.1     G89.29 338.29      HTN- uncontrolled  Amlodipine rx given  Tylenol #3 rx given for right wrist pain  F/u in 1 month for hypertension      Follow-up Disposition:  Return in about 1 month (around 8/23/2018), or if symptoms worsen or fail to improve. I asked the patient if she  had any questions and answered her  questions.   The patient stated that she understands the treatment plan and agrees with the treatment plan    This is an Initial Medicare Annual Wellness Exam (AWV) (Performed 12 months after IPPE or effective date of Medicare Part B enrollment, Once in a lifetime)    I have reviewed the patient's medical history in detail and updated the computerized patient record. History     Past Medical History:   Diagnosis Date    Arthritis       History reviewed. No pertinent surgical history. Current Outpatient Prescriptions   Medication Sig Dispense Refill    acetaminophen-codeine (TYLENOL #3) 300-30 mg per tablet Take 1 Tab by mouth every four (4) hours as needed for Pain. Max Daily Amount: 6 Tabs. 28 Tab 0    amLODIPine (NORVASC) 5 mg tablet Take 1 Tab by mouth daily. 30 Tab 1     No Known Allergies  Family History   Problem Relation Age of Onset    Cancer Mother      Social History   Substance Use Topics    Smoking status: Never Smoker    Smokeless tobacco: Never Used    Alcohol use No     Patient Active Problem List   Diagnosis Code    Arthritis M19.90    Hypertension I10       Depression Risk Factor Screening:     PHQ over the last two weeks 7/9/2018   Little interest or pleasure in doing things Several days   Feeling down, depressed, irritable, or hopeless Several days   Total Score PHQ 2 2     Alcohol Risk Factor Screening:   Are appropriate based on today's review and evaluation    Functional Ability and Level of Safety:     Hearing Loss  Hearing is good. Activities of Daily Living  The home contains: no safety equipment. Patient does total self care    Fall Risk  Fall Risk Assessment, last 12 mths 7/9/2018   Able to walk? Yes   Fall in past 12 months?  No       Abuse Screen  Patient is not abused    Cognitive Screening   Evaluation of Cognitive Function:  Has your family/caregiver stated any concerns about your memory: no  Normal    Patient Care Team   Patient Care Team:  Juan Cabrera NP as PCP - General (Nurse Practitioner)  Jamel Strange MD (Family Practice)    Assessment/Plan   Education and counseling provided:  Are appropriate based on today's review and evaluation    Diagnoses and all orders for this visit:    1. Hypertension, unspecified type  -     amLODIPine (NORVASC) 5 mg tablet; Take 1 Tab by mouth daily. 2. Right wrist pain  -     acetaminophen-codeine (TYLENOL #3) 300-30 mg per tablet; Take 1 Tab by mouth every four (4) hours as needed for Pain. Max Daily Amount: 6 Tabs.     3. Chronic thoracic back pain, unspecified back pain laterality         Health Maintenance Due   Topic Date Due    ZOSTER VACCINE AGE 60>  07/02/2009    MEDICARE YEARLY EXAM  03/14/2018

## 2018-07-23 NOTE — MR AVS SNAPSHOT
[address] [place] [address], [address] [facility] [address] 
[phone] Patient: Crystal Manuel MRN: [id number] XRB:[id number] Visit Information Date & Time Provider Department Dept. Phone Encounter #  
 7/23/2018  9:15 AM Negra Dominguez NP [facility] [phone] [id number] Follow-up Instructions Return in about 1 month (around 8/23/2018), or if symptoms worsen or fail to improve. Your Appointments 8/13/2018 11:00 AM  
New Patient with Sienna Becerra MD  
[address] [facility], [address] and [facility]) Appt Note: THORACIC BACK PAIN/REF & SCHD BY ARYA MAKI/RODOLFO 10:30AM, ID, INS CARD, MED LIST  
 [address] [facility] [address] [phone]  
  
   
 [address] [facility] [facility] [address] Upcoming Health Maintenance Date Due ZOSTER VACCINE AGE 60> 7/2/2009 MEDICARE YEARLY EXAM 3/14/2018 GLAUCOMA SCREENING Q2Y 8/23/2018* Bone Densitometry (Dexa) Screening 8/23/2018* BREAST CANCER SCRN MAMMOGRAM 8/23/2018* FOBT Q 1 YEAR AGE 50-75 8/23/2018* DTaP/Tdap/Td series (1 - Tdap) 7/3/2023* Influenza Age 5 to Adult 8/1/2018 Pneumococcal 65+ Low/Medium Risk (2 of 2 - PPSV23) 7/9/2019 *Topic was postponed. The date shown is not the original due date. Allergies as of 7/23/2018  Review Complete On: 7/23/2018 By: Albino Stover LPN No Known Allergies Current Immunizations  Reviewed on 7/20/2018 Name Date Pneumococcal Conjugate (PCV-13) 7/9/2018 Not reviewed this visit You Were Diagnosed With   
  
 Codes Comments Hypertension, unspecified type    -  Primary ICD-10-CM: I10 
ICD-9-CM: 401.9 Right wrist pain     ICD-10-CM: M25.531 ICD-9-CM: 719.43 Chronic thoracic back pain, unspecified back pain laterality     ICD-10-CM: M54.6, G89.29 ICD-9-CM: 724.1, 338.29 Vitals BP Pulse Temp Resp Height(growth percentile) (!) 172/100 (BP 1 Location: Left arm, BP Patient Position: Sitting) 84 97.7 °F (36.5 °C) (Tympanic) 16 5' (1.524 m) Weight(growth percentile) SpO2 BMI OB Status Smoking Status 142 lb (64.4 kg) 99% 27.73 kg/m2 Menopause Never Smoker Vitals History BMI and BSA Data Body Mass Index Body Surface Area  
 27.73 kg/m 2 1.65 m 2 Preferred Pharmacy Pharmacy Name Phone [address] [place] [address]. [phone] Your Updated Medication List  
  
   
This list is accurate as of 7/23/18 10:00 AM.  Always use your most recent med list.  
  
  
  
  
 acetaminophen-codeine 300-30 mg per tablet Commonly known as:  TYLENOL #3 Take 1 Tab by mouth every four (4) hours as needed for Pain. Max Daily Amount: 6 Tabs. amLODIPine 5 mg tablet Commonly known as:  Leslie Blade Take 1 Tab by mouth daily. Prescriptions Printed Refills  
 acetaminophen-codeine (TYLENOL #3) 300-30 mg per tablet 0 Sig: Take 1 Tab by mouth every four (4) hours as needed for Pain. Max Daily Amount: 6 Tabs. Class: Print Route: Oral  
  
Prescriptions Sent to Pharmacy Refills  
 amLODIPine (NORVASC) 5 mg tablet 1 Sig: Take 1 Tab by mouth daily. Class: Normal  
 Pharmacy: [facility] [place] [facility] [address]  [address] [place] [address].  #: [phone] Route: Oral  
  
Follow-up Instructions Return in about 1 month (around 8/23/2018), or if symptoms worsen or fail to improve. To-Do List   
 07/24/2018 2:00 PM  
  Appointment with [facility] MARCOS ALBRECHT [address] 1 at [facility] ([phone]) OUTSIDE FILMS  - Any outside films related to the study being scheduled should be brought with you on the day of the exam.  If this cannot be done there may be a delay in the reading of the study.   MEDICATIONS  - Patient must bring a complete list of all medications currently taking to include prescriptions, over-the-counter meds, herbals, vitamins & any dietary supplements  GENERAL INSTRUCTIONS  - On the day of your exam do not use any bath powder, deodorant or lotions on the armpit area. -Tenderness of breasts may cause an increase of discomfort during procedure. If you are experiencing breast tenderness on the day of your appointment and would like to reschedule, please call [phone].  
  
 07/24/2018 3:00 PM  
  Appointment with [facility] BONE DENSITY RM 1 at [address] ([phone]) OUTSIDE FILMS  - Any outside films related to the study being scheduled should be brought with you on the day of the exam.  If this cannot be done there may be a delay in the reading of the study. MEDICATIONS  - Patient must bring a complete list of all medications currently taking to include prescriptions, over-the-counter meds, herbals, vitamins & any dietary supplements - Patient must discontinue use of calcium, vitamins, or calcium supplements including antacids (calcium based) 24 hours before scan. CHECK IN INSTRUCTIONS  For DEXA/Bone Density Studies:  Check in to the [facility] 15 minutes prior to your appointment. [address] Introducing [facility] [place] [facility]! Meg Couch introduces [employer] patient portal. Now you can access parts of your medical record, email your doctor's office, and request medication refills online. 1. In your internet browser, go to [email or web address] 2. Click on the First Time User? Click Here link in the Sign In box. You will see the New Member Sign Up page. 3. Enter your [employer] Access Code exactly as it appears below. You will not need to use this code after youve completed the sign-up process. If you do not sign up before the expiration date, you must request a new code. · [employer] Access Code: [email or web address] Expires: 9/30/2018  9:45 AM 
 
4. Enter the last four digits of your Social Security Number (xxxx) and Date of Birth (mm/dd/yyyy) as indicated and click Submit. You will be taken to the next sign-up page. 5. Create a [employer] ID. This will be your [employer] login ID and cannot be changed, so think of one that is secure and easy to remember. 6. Create a [employer] password. You can change your password at any time. 7. Enter your Password Reset Question and Answer. This can be used at a later time if you forget your password. 8. Enter your e-mail address. You will receive e-mail notification when new information is available in [address] 9. Click Sign Up. You can now view and download portions of your medical record. 10. Click the Download Summary menu link to download a portable copy of your medical information. If you have questions, please visit the Frequently Asked Questions section of the [employer] website. Remember, [employer] is NOT to be used for urgent needs. For medical emergencies, dial 911. Now available from your iPhone and Android! Please provide this summary of care documentation to your next provider. Your primary care clinician is listed as JULIAN VÁZQUEZ. If you have any questions after today's visit, please call [phone].

## 2018-08-10 ENCOUNTER — PATIENT OUTREACH (OUTPATIENT)
Dept: INTERNAL MEDICINE CLINIC | Age: 68
End: 2018-08-10

## 2018-08-10 NOTE — PROGRESS NOTES
Patient has graduated from the Transitions of Care Coordination  program on 8-10-18. Patient/family has the ability to self-manage. Care management goals have been completed at this time. No further nurse navigator follow up scheduled. Goals Addressed             Most Recent       Patient Stated     COMPLETED: Establish PCP relationships and regularly scheduled appointments. (pt-stated)   On track (8/10/2018)             Target Date:  7-5-18  Plan:  Estabish care with [facility]     8-10-18 Update:   Patient established care with Konstantin Bartholomew with [facility]. Patient attended PCP appointments on 7-9-18 and 7-23-18.               Patients upcoming visits:  Future Appointments  Date Time Provider Chaka Alvarez   8/13/2018 11:00 AM Pk Santos  [address] 8/13/2018 12:15 PM [facility] MARCOS RM 1 HBVRMAM [facility]   8/13/2018 1:30 PM [facility] BONE DEXA RM 1 HBVRBD [facility]   8/21/2018 8:30 AM Pauline Kang  [address] [place] [address]

## 2022-03-18 PROBLEM — I10 HYPERTENSION: Status: ACTIVE | Noted: 2018-07-23

## 2022-03-19 PROBLEM — M19.90 ARTHRITIS: Status: ACTIVE | Noted: 2018-07-09

## 2024-04-25 ENCOUNTER — TRANSCRIBE ORDERS (OUTPATIENT)
Facility: HOSPITAL | Age: 74
End: 2024-04-25

## 2024-04-25 DIAGNOSIS — R92.8 ABNORMAL MAMMOGRAM OF BOTH BREASTS: Primary | ICD-10-CM

## 2024-12-02 ENCOUNTER — TRANSCRIBE ORDERS (OUTPATIENT)
Facility: HOSPITAL | Age: 74
End: 2024-12-02

## 2024-12-02 DIAGNOSIS — Z78.0 MENOPAUSE: Primary | ICD-10-CM
